# Patient Record
Sex: MALE | Race: OTHER | HISPANIC OR LATINO | Employment: UNEMPLOYED | URBAN - NONMETROPOLITAN AREA
[De-identification: names, ages, dates, MRNs, and addresses within clinical notes are randomized per-mention and may not be internally consistent; named-entity substitution may affect disease eponyms.]

---

## 2024-10-04 ENCOUNTER — OFFICE VISIT (OUTPATIENT)
Dept: URGENT CARE | Facility: CLINIC | Age: 19
End: 2024-10-04
Payer: COMMERCIAL

## 2024-10-04 VITALS
DIASTOLIC BLOOD PRESSURE: 63 MMHG | TEMPERATURE: 97.9 F | RESPIRATION RATE: 20 BRPM | SYSTOLIC BLOOD PRESSURE: 123 MMHG | HEART RATE: 91 BPM | OXYGEN SATURATION: 99 %

## 2024-10-04 DIAGNOSIS — R10.32 LEFT LOWER QUADRANT ABDOMINAL PAIN: Primary | ICD-10-CM

## 2024-10-04 PROCEDURE — G0382 LEV 3 HOSP TYPE B ED VISIT: HCPCS | Performed by: PHYSICIAN ASSISTANT

## 2024-10-04 NOTE — PATIENT INSTRUCTIONS
Follow up with general surgery to r/o hernia  Heat  Tylenol for pain  Monitor for worsening symptoms including, but not limited to, vomiting, increase in pain, or inability to pass a bowel movement   Follow up with PCP in 3-5 days.  Proceed to  ER if symptoms worsen.    If tests have been performed at [facility], our office will contact you with results if changes need to be made to the care plan discussed with you at the visit.  You can review your full results on [facility] MyChart.

## 2024-10-04 NOTE — PROGRESS NOTES
[facility]        NAME: Ryan Abraham is a 19 y.o. male  : 2005    MRN: [id number]  DATE: 2024  TIME: 5:03 PM    Assessment and Plan   Left lower quadrant abdominal pain [R10.32]  1. Left lower quadrant abdominal pain  Ambulatory Referral to General Surgery          Registration notes: registration unable to understand patient. Patient is not experiencing leg pain.     General surgery referral to r/o hernia. Differential includes groin strain.    Patient Instructions     Follow up with general surgery to r/o hernia  Heat  Tylenol for pain  Monitor for worsening symptoms including, but not limited to, vomiting, increase in pain, or inability to pass a bowel movement   Follow up with PCP in 3-5 days.  Proceed to  ER if symptoms worsen.    If tests have been performed at [facility], our office will contact you with results if changes need to be made to the care plan discussed with you at the visit.  You can review your full results on [facility] MyChart.    Chief Complaint     Chief Complaint   Patient presents with    Abdominal Pain     X 1 month.  Eating and drinking Ok.  Denies N/V/D         History of Present Illness       States he was working at a [employer] when the pain started. States pain is worse with lifting. States he may feel a lump in the area. Denies changes to bowel movements. Denies prior similar symptoms.     Abdominal Pain  This is a new problem. The current episode started 1 to 4 weeks ago. The onset quality is sudden. The problem occurs intermittently. Duration: 20 minutes. The problem has been gradually improving (2 weeks) since onset. The pain is located in the LLQ. The pain is at a severity of 5/10. The quality of the pain is described as burning (pinching). Radiates to: No longer radiating: L leg, lower abdomen, and LUQ. Pertinent negatives include no constipation, diarrhea, dysuria, fever, frequency, hematochezia, hematuria, melena, nausea or vomiting.  Relieved by: laying down. Past treatments include acetaminophen (ointment). There is no past medical history of abdominal surgery.       Review of Systems   Review of Systems   Constitutional:  Negative for chills and fever.   Gastrointestinal:  Positive for abdominal pain. Negative for blood in stool, constipation, diarrhea, hematochezia, melena, nausea and vomiting.   Genitourinary:  Negative for dysuria, frequency, hematuria and urgency.         Current Medications     No current outpatient medications on file.    Current Allergies     Allergies as of 10/04/2024    (No Known Allergies)            The following portions of the patient's history were reviewed and updated as appropriate: allergies, current medications, past family history, past medical history, past social history, past surgical history and problem list.     History reviewed. No pertinent past medical history.    History reviewed. No pertinent surgical history.    History reviewed. No pertinent family history.      Medications have been verified.        Objective   /63   Pulse 91   Temp 97.9 °F (36.6 °C)   Resp 20   SpO2 99%   No LMP for male patient.       Physical Exam     Physical Exam  Constitutional:       General: He is not in acute distress.     Appearance: He is well-developed. He is not diaphoretic.   Cardiovascular:      Rate and Rhythm: Normal rate and regular rhythm.      Heart sounds: No murmur heard.     No friction rub. No gallop.   Pulmonary:      Effort: Pulmonary effort is normal. No respiratory distress.      Breath sounds: Normal breath sounds. No wheezing, rhonchi or rales.   Abdominal:      General: Bowel sounds are normal. There is no distension.      Palpations: Abdomen is soft. There is no mass.      Tenderness: There is no abdominal tenderness. There is no guarding or rebound.      Comments: No masses appreciated laying or standing.     Skin:     General: Skin is warm.   Neurological:      Mental Status: He is  alert.   Psychiatric:         Mood and Affect: Mood and affect normal.         Behavior: Behavior normal.         Thought Content: Thought content normal.         Judgment: Judgment normal.